# Patient Record
Sex: MALE | Race: WHITE | Employment: FULL TIME | URBAN - METROPOLITAN AREA
[De-identification: names, ages, dates, MRNs, and addresses within clinical notes are randomized per-mention and may not be internally consistent; named-entity substitution may affect disease eponyms.]

---

## 2019-03-04 ENCOUNTER — HOSPITAL ENCOUNTER (OUTPATIENT)
Dept: ULTRASOUND IMAGING | Age: 59
Discharge: HOME | End: 2019-03-04
Attending: NURSE PRACTITIONER
Payer: COMMERCIAL

## 2019-03-04 DIAGNOSIS — N44.2 TESTICULAR CYST: ICD-10-CM

## 2019-03-04 DIAGNOSIS — N50.811 RIGHT TESTICULAR PAIN: ICD-10-CM

## 2019-03-04 PROCEDURE — 76870 US EXAM SCROTUM: CPT

## 2021-08-11 ENCOUNTER — ANESTHESIA EVENT (OUTPATIENT)
Dept: SURGERY | Age: 61
End: 2021-08-11
Payer: COMMERCIAL

## 2021-08-12 ENCOUNTER — HOSPITAL ENCOUNTER (OUTPATIENT)
Age: 61
Setting detail: OUTPATIENT SURGERY
Discharge: HOME | End: 2021-08-12
Attending: UROLOGY | Admitting: UROLOGY
Payer: COMMERCIAL

## 2021-08-12 ENCOUNTER — ANESTHESIA (OUTPATIENT)
Dept: SURGERY | Age: 61
End: 2021-08-12
Payer: COMMERCIAL

## 2021-08-12 VITALS
HEIGHT: 67 IN | BODY MASS INDEX: 24.52 KG/M2 | RESPIRATION RATE: 18 BRPM | DIASTOLIC BLOOD PRESSURE: 94 MMHG | OXYGEN SATURATION: 96 % | HEART RATE: 44 BPM | SYSTOLIC BLOOD PRESSURE: 155 MMHG | TEMPERATURE: 97 F | WEIGHT: 156.2 LBS

## 2021-08-12 DIAGNOSIS — N50.3 EPIDIDYMAL CYST: Primary | ICD-10-CM

## 2021-08-12 PROCEDURE — 74011250636 HC RX REV CODE- 250/636: Performed by: ANESTHESIOLOGY

## 2021-08-12 PROCEDURE — 77030019908 HC STETH ESOPH SIMS -A: Performed by: REGISTERED NURSE

## 2021-08-12 PROCEDURE — 76060000033 HC ANESTHESIA 1 TO 1.5 HR: Performed by: UROLOGY

## 2021-08-12 PROCEDURE — 74011000250 HC RX REV CODE- 250: Performed by: UROLOGY

## 2021-08-12 PROCEDURE — 2709999900 HC NON-CHARGEABLE SUPPLY: Performed by: UROLOGY

## 2021-08-12 PROCEDURE — 77030010509 HC AIRWY LMA MSK TELE -A: Performed by: REGISTERED NURSE

## 2021-08-12 PROCEDURE — 74011250636 HC RX REV CODE- 250/636: Performed by: UROLOGY

## 2021-08-12 PROCEDURE — 77030002933 HC SUT MCRYL J&J -A: Performed by: UROLOGY

## 2021-08-12 PROCEDURE — 74011250636 HC RX REV CODE- 250/636: Performed by: NURSE ANESTHETIST, CERTIFIED REGISTERED

## 2021-08-12 PROCEDURE — 77030040922 HC BLNKT HYPOTHRM STRY -A: Performed by: REGISTERED NURSE

## 2021-08-12 PROCEDURE — 54840 REMOVE EPIDIDYMIS LESION: CPT | Performed by: UROLOGY

## 2021-08-12 PROCEDURE — 76210000020 HC REC RM PH II FIRST 0.5 HR: Performed by: UROLOGY

## 2021-08-12 PROCEDURE — 74011000250 HC RX REV CODE- 250: Performed by: NURSE ANESTHETIST, CERTIFIED REGISTERED

## 2021-08-12 PROCEDURE — 77030010507 HC ADH SKN DERMBND J&J -B: Performed by: UROLOGY

## 2021-08-12 PROCEDURE — 77030011283 HC ELECTRD NDL COVD -A: Performed by: UROLOGY

## 2021-08-12 PROCEDURE — 77030040503 HC DRN WND PENRS MDII -A: Performed by: UROLOGY

## 2021-08-12 PROCEDURE — 77030002888 HC SUT CHRMC J&J -A: Performed by: UROLOGY

## 2021-08-12 PROCEDURE — 77030040361 HC SLV COMPR DVT MDII -B: Performed by: UROLOGY

## 2021-08-12 PROCEDURE — 74011250637 HC RX REV CODE- 250/637: Performed by: ANESTHESIOLOGY

## 2021-08-12 PROCEDURE — 76210000006 HC OR PH I REC 0.5 TO 1 HR: Performed by: UROLOGY

## 2021-08-12 PROCEDURE — 76010000149 HC OR TIME 1 TO 1.5 HR: Performed by: UROLOGY

## 2021-08-12 RX ORDER — CEFAZOLIN SODIUM/WATER 2 G/20 ML
2 SYRINGE (ML) INTRAVENOUS ONCE
Status: COMPLETED | OUTPATIENT
Start: 2021-08-12 | End: 2021-08-12

## 2021-08-12 RX ORDER — SODIUM CHLORIDE 0.9 % (FLUSH) 0.9 %
5-40 SYRINGE (ML) INJECTION EVERY 8 HOURS
Status: DISCONTINUED | OUTPATIENT
Start: 2021-08-12 | End: 2021-08-12 | Stop reason: HOSPADM

## 2021-08-12 RX ORDER — ONDANSETRON 2 MG/ML
INJECTION INTRAMUSCULAR; INTRAVENOUS AS NEEDED
Status: DISCONTINUED | OUTPATIENT
Start: 2021-08-12 | End: 2021-08-12 | Stop reason: HOSPADM

## 2021-08-12 RX ORDER — MIDAZOLAM HYDROCHLORIDE 1 MG/ML
2 INJECTION, SOLUTION INTRAMUSCULAR; INTRAVENOUS
Status: DISCONTINUED | OUTPATIENT
Start: 2021-08-12 | End: 2021-08-12 | Stop reason: HOSPADM

## 2021-08-12 RX ORDER — PROPOFOL 10 MG/ML
INJECTION, EMULSION INTRAVENOUS AS NEEDED
Status: DISCONTINUED | OUTPATIENT
Start: 2021-08-12 | End: 2021-08-12 | Stop reason: HOSPADM

## 2021-08-12 RX ORDER — NALOXONE HYDROCHLORIDE 0.4 MG/ML
0.1 INJECTION, SOLUTION INTRAMUSCULAR; INTRAVENOUS; SUBCUTANEOUS AS NEEDED
Status: DISCONTINUED | OUTPATIENT
Start: 2021-08-12 | End: 2021-08-12 | Stop reason: HOSPADM

## 2021-08-12 RX ORDER — FENTANYL CITRATE 50 UG/ML
100 INJECTION, SOLUTION INTRAMUSCULAR; INTRAVENOUS ONCE
Status: DISCONTINUED | OUTPATIENT
Start: 2021-08-12 | End: 2021-08-12 | Stop reason: HOSPADM

## 2021-08-12 RX ORDER — FAMOTIDINE 20 MG/1
20 TABLET, FILM COATED ORAL ONCE
Status: COMPLETED | OUTPATIENT
Start: 2021-08-12 | End: 2021-08-12

## 2021-08-12 RX ORDER — HYDROMORPHONE HYDROCHLORIDE 2 MG/ML
INJECTION, SOLUTION INTRAMUSCULAR; INTRAVENOUS; SUBCUTANEOUS AS NEEDED
Status: DISCONTINUED | OUTPATIENT
Start: 2021-08-12 | End: 2021-08-12 | Stop reason: HOSPADM

## 2021-08-12 RX ORDER — LIDOCAINE HYDROCHLORIDE 10 MG/ML
0.1 INJECTION INFILTRATION; PERINEURAL AS NEEDED
Status: DISCONTINUED | OUTPATIENT
Start: 2021-08-12 | End: 2021-08-12 | Stop reason: HOSPADM

## 2021-08-12 RX ORDER — SODIUM CHLORIDE, SODIUM LACTATE, POTASSIUM CHLORIDE, CALCIUM CHLORIDE 600; 310; 30; 20 MG/100ML; MG/100ML; MG/100ML; MG/100ML
100 INJECTION, SOLUTION INTRAVENOUS CONTINUOUS
Status: DISCONTINUED | OUTPATIENT
Start: 2021-08-12 | End: 2021-08-12 | Stop reason: HOSPADM

## 2021-08-12 RX ORDER — SODIUM CHLORIDE 0.9 % (FLUSH) 0.9 %
5-40 SYRINGE (ML) INJECTION AS NEEDED
Status: DISCONTINUED | OUTPATIENT
Start: 2021-08-12 | End: 2021-08-12 | Stop reason: HOSPADM

## 2021-08-12 RX ORDER — LIDOCAINE HYDROCHLORIDE 20 MG/ML
INJECTION, SOLUTION EPIDURAL; INFILTRATION; INTRACAUDAL; PERINEURAL AS NEEDED
Status: DISCONTINUED | OUTPATIENT
Start: 2021-08-12 | End: 2021-08-12 | Stop reason: HOSPADM

## 2021-08-12 RX ORDER — HYDROMORPHONE HYDROCHLORIDE 2 MG/ML
0.5 INJECTION, SOLUTION INTRAMUSCULAR; INTRAVENOUS; SUBCUTANEOUS
Status: DISCONTINUED | OUTPATIENT
Start: 2021-08-12 | End: 2021-08-12 | Stop reason: HOSPADM

## 2021-08-12 RX ORDER — SODIUM CHLORIDE 9 MG/ML
25 INJECTION, SOLUTION INTRAVENOUS CONTINUOUS
Status: DISCONTINUED | OUTPATIENT
Start: 2021-08-12 | End: 2021-08-12 | Stop reason: HOSPADM

## 2021-08-12 RX ORDER — HYDROCODONE BITARTRATE AND ACETAMINOPHEN 5; 325 MG/1; MG/1
1 TABLET ORAL
Qty: 20 TABLET | Refills: 0 | Status: SHIPPED | OUTPATIENT
Start: 2021-08-12 | End: 2021-08-17

## 2021-08-12 RX ORDER — HYDROCODONE BITARTRATE AND ACETAMINOPHEN 7.5; 325 MG/1; MG/1
1 TABLET ORAL AS NEEDED
Status: DISCONTINUED | OUTPATIENT
Start: 2021-08-12 | End: 2021-08-12 | Stop reason: HOSPADM

## 2021-08-12 RX ORDER — OXYCODONE HYDROCHLORIDE 5 MG/1
5 TABLET ORAL
Status: COMPLETED | OUTPATIENT
Start: 2021-08-12 | End: 2021-08-12

## 2021-08-12 RX ORDER — BUPIVACAINE HYDROCHLORIDE 5 MG/ML
INJECTION, SOLUTION EPIDURAL; INTRACAUDAL AS NEEDED
Status: DISCONTINUED | OUTPATIENT
Start: 2021-08-12 | End: 2021-08-12 | Stop reason: HOSPADM

## 2021-08-12 RX ADMIN — OXYCODONE HYDROCHLORIDE 5 MG: 5 TABLET ORAL at 17:38

## 2021-08-12 RX ADMIN — HYDROMORPHONE HYDROCHLORIDE 0.2 MG: 2 INJECTION INTRAMUSCULAR; INTRAVENOUS; SUBCUTANEOUS at 17:07

## 2021-08-12 RX ADMIN — HYDROMORPHONE HYDROCHLORIDE 0.2 MG: 2 INJECTION INTRAMUSCULAR; INTRAVENOUS; SUBCUTANEOUS at 17:00

## 2021-08-12 RX ADMIN — ONDANSETRON 4 MG: 2 INJECTION INTRAMUSCULAR; INTRAVENOUS at 16:15

## 2021-08-12 RX ADMIN — HYDROMORPHONE HYDROCHLORIDE 0.2 MG: 2 INJECTION INTRAMUSCULAR; INTRAVENOUS; SUBCUTANEOUS at 17:03

## 2021-08-12 RX ADMIN — CEFAZOLIN 2 G: 1 INJECTION, POWDER, FOR SOLUTION INTRAVENOUS at 16:15

## 2021-08-12 RX ADMIN — FAMOTIDINE 20 MG: 20 TABLET, FILM COATED ORAL at 14:03

## 2021-08-12 RX ADMIN — LIDOCAINE HYDROCHLORIDE 100 MG: 20 INJECTION, SOLUTION EPIDURAL; INFILTRATION; INTRACAUDAL; PERINEURAL at 16:10

## 2021-08-12 RX ADMIN — PROPOFOL 300 MG: 10 INJECTION, EMULSION INTRAVENOUS at 16:10

## 2021-08-12 RX ADMIN — SODIUM CHLORIDE, SODIUM LACTATE, POTASSIUM CHLORIDE, AND CALCIUM CHLORIDE: 600; 310; 30; 20 INJECTION, SOLUTION INTRAVENOUS at 16:04

## 2021-08-12 NOTE — ANESTHESIA PREPROCEDURE EVALUATION
Relevant Problems   No relevant active problems       Anesthetic History               Review of Systems / Medical History  Patient summary reviewed and pertinent labs reviewed    Pulmonary                   Neuro/Psych              Cardiovascular              Hyperlipidemia    Exercise tolerance: >4 METS     GI/Hepatic/Renal     GERD           Endo/Other      Hypothyroidism       Other Findings              Physical Exam    Airway  Mallampati: II  TM Distance: > 6 cm  Neck ROM: normal range of motion   Mouth opening: Normal     Cardiovascular  Regular rate and rhythm,  S1 and S2 normal,  no murmur, click, rub, or gallop             Dental  No notable dental hx       Pulmonary  Breath sounds clear to auscultation               Abdominal         Other Findings            Anesthetic Plan    ASA: 2  Anesthesia type: general            Anesthetic plan and risks discussed with: Patient

## 2021-08-12 NOTE — BRIEF OP NOTE
Brief Postoperative Note    Patient: [facility]  YOB: 1960  MRN: [id number]    Date of Procedure: 8/12/2021     Pre-Op Diagnosis: Scrotal cyst [L72.9]  Spermatocele [N43.40]  Hyperplasia of prostate with urinary obstruction [N40.1, N13.8]    Post-Op Diagnosis: Same as preoperative diagnosis.       Procedure(s):  RIGHT SPERMATOCELECTOMY    Surgeon(s):  Yin Saldana MD    Surgical Assistant: None    Anesthesia: General     Estimated Blood Loss (mL): Minimal    Complications: None    Specimens: * No specimens in log *     Implants: * No implants in log *    Drains: * No LDAs found *    Findings: see op note    Electronically Signed by Juany Art MD on 8/12/2021 at 5:00 PM

## 2021-08-12 NOTE — H&P
Steve Prescott  : 1960         Chief Complaint   Patient presents with    Cyst         HPI      Steve Prescott is a 63 y.o. male had acute onset of perineal pressure and difficulty voiding.   He was evaluated by Dr. Austen Portillo and was given a prescription for ciprofloxacin and also tamsulosin. Shelton Lucia reports after a couple of doses of medication his symptoms started to resolve. Shelton Lucia has completed the ciprofloxacin and stop the Flomax because he did not like the side effects of the retrograde ejaculation. Shelton Lucia did go to the pharmacy and is currently taking saw palmetto.     Currently voiding well. May have had prostatitis.    Exam by NP showed possible right spermatocele. He is considering having this removed. He finished course of Cipro, stopped Flomax. Had scrotal US 3-19: IMPRESSION:  1. Large epididymal head cyst/spermatocele on the right. 2. Indeterminate hypoechoic area within the right epididymal head measuring 6 x  3 x 8 mm. Recommend 3-6 month follow-up exam for further evaluation. PSA 2.7 in 2018.   Exam in 2019 showed 2-3 cm spermatocele on right. We planned spermatocelectomy then,but never had the surgery due the Covid pandemic. He returns to discuss treatment. The right spermatocele is getting larger and more bothersome. He also has somewhat weak urinary stream.   He races vintage cars. Past Medical History:   Diagnosis Date    Allergic rhinitis, cause unspecified      Chronic prostatitis      Impotence of organic origin      Other and unspecified hyperlipidemia      Unspecified disorder of male genital organs      Unspecified disorder of thyroid        No past surgical history on file.          Current Outpatient Medications   Medication Sig Dispense Refill    aspirin delayed-release 81 mg tablet Take 81 mg by mouth daily.        cholecalciferol (VITAMIN D3) 1,000 unit cap Take 1,000 Units by mouth daily.        pravastatin (PRAVACHOL) 10 mg tablet Take  by mouth nightly.        levothyroxine (SYNTHROID) 50 mcg tablet Take  by mouth Daily (before breakfast).        sildenafil citrate (VIAGRA) 50 mg tablet Take 50 mg by mouth as needed.                Allergies   Allergen Reactions    Other Medication Unknown (comments)       Steroids      Social History            Socioeconomic History    Marital status:        Spouse name: Not on file    Number of children: Not on file    Years of education: Not on file    Highest education level: Not on file   Occupational History    Not on file   Tobacco Use    Smoking status: Never Smoker    Smokeless tobacco: Never Used   Substance and Sexual Activity    Alcohol use: Yes       Alcohol/week: 0.0 standard drinks    Drug use: Never    Sexual activity: Not on file   Other Topics Concern    Not on file   Social History Narrative    Not on file      Social Determinants of Health          Financial Resource Strain:     Difficulty of Paying Living Expenses:    Food Insecurity:     Worried About Running Out of Food in the Last Year:     [address] in the Last Year:    Transportation Needs:     Lack of Transportation (Medical):  Lack of Transportation (Non-Medical):    Physical Activity:     Days of Exercise per Week:     Minutes of Exercise per Session:    Stress:     Feeling of Stress :    Social Connections:     Frequency of Communication with Friends and Family:     Frequency of Social Gatherings with Friends and Family:     Attends Sikh Services:     Active Member of Clubs or Organizations:     Attends Club or Organization Meetings:     Marital Status:    Intimate Partner Violence:     Fear of Current or Ex-Partner:     Emotionally Abused:     Physically Abused:     Sexually Abused:             Family History   Problem Relation Age of Onset    Hypertension Father           Review of Systems  Constitutional:   Negative for fever. GI:  Negative for nausea.   Genitourinary: Positive for testicular pain.        There were no vitals taken for this visit. Physical Exam  General   Mental Status - Patient is alert and oriented X3. General Appearance - Not in acute distress. Build & Nutrition - Well nourished and Well developed. Gait - Normal.        Eye   Pupil - Bilateral - Normal, Equal and Round.        Chest and Lung Exam   Auscultation:   Lung Sounds: - Normal, clear to auscultation.        Cardiovascular   Cardiovascular examination reveals  - normal heart sounds, regular rate and rhythm with no murmurs.        Abdomen   Palpation/Percussion: Palpation and Percussion of the abdomen reveal - No Rebound tenderness, No Rigidity (guarding), No hepatosplenomegaly and No Palpable abdominal masses. Auscultation: Auscultation of the abdomen reveals - Bowel sounds normal.        Male Genitourinary   Evaluation of genitourinary system reveals - scrotum non-tender, no masses, normal testes, no palpable masses, 5 cm cystic structure superior to right testis. , urethral meatus normal, no discharge, normal penis   LETICIA deferred. Peripheral Vascular   Lower Extremity: Inspection - Bilateral - Inspection Normal.        Neurologic   Neurologic evaluation reveals  - upper and lower extremity deep tendon reflexes intact bilaterally . Cranial Nerves: - Cranial nerves are grossly intact.        Neuropsychiatric   The patient's mood and affect are described as  - normal.        Musculoskeletal  Global Assessment   Examination of related systems reveals - no generalized swelling or edema of extremities.        Lymphatic  General Lymphatics   Description - No Generalized lymphadenopathy.  Tenderness - Non Tender.        Urinalysis  UA - Dipstick         Results for orders placed or performed in visit on 07/21/21   AMB POC URINALYSIS DIP STICK AUTO W/O MICRO (PGU)     Status: None   Result Value Ref Range Status     Glucose (UA POC) Negative Negative mg/dL Final     Bilirubin (UA POC) Negative Negative Final     Ketones (UA POC) Negative Negative Final     Specific gravity (UA POC) 1.010 1.001 - 1.035 Final     Blood (UA POC) Negative Negative Final     pH (UA POC) 7 4.6 - 8.0 Final     Protein (UA POC) Negative Negative Final     Urobilinogen (POC) 0.2 mg/dL   Final     Nitrites (UA POC) Negative Negative Final     Leukocyte esterase (UA POC) Negative Negative Final   Results for orders placed or performed in visit on 04/08/19   AMB POC URINALYSIS DIP STICK AUTO W/ MICRO (PGU)     Status: None   Result Value Ref Range Status     Glucose (UA POC) Negative Negative mg/dL Final     Bilirubin (UA POC) Negative Negative Final     Ketones (UA POC) Negative Negative Final     Specific gravity (UA POC) 1.010 1.001 - 1.035 Final     Blood (UA POC) Negative Negative Final     pH (UA POC) 6.5 4.6 - 8.0 Final     Protein (UA POC) Negative Negative Final     Urobilinogen (POC) 0.2 mg/dL   Final     Nitrites (UA POC) Negative Negative Final     Leukocyte esterase (UA POC) Negative Negative Final         UA - Micro  WBC - 0  RBC - 0  Bacteria - 0  Epith - 0     Physical Exam     Assessment and Plan      ICD-10-CM ICD-9-CM     1. Scrotal cyst  L72.9 706.2 AMB POC URINALYSIS DIP STICK AUTO W/O MICRO (PGU)   2. Spermatocele of epididymis  N43.40 608. 1     3. BPH with obstruction/lower urinary tract symptoms  N40.1 600.01       N13.8 599.69        He would like to proceed with right spermatocelectomy. Risk of bleeding, infection, discussed. He does not treatment for his BPH. Needs PSA this year.

## 2021-08-12 NOTE — ANESTHESIA POSTPROCEDURE EVALUATION
Procedure(s):  RIGHT SPERMATOCELECTOMY.     general    Anesthesia Post Evaluation      Multimodal analgesia: multimodal analgesia used between 6 hours prior to anesthesia start to PACU discharge  Patient location during evaluation: PACU  Patient participation: complete - patient participated  Level of consciousness: awake and alert  Pain management: adequate  Airway patency: patent  Anesthetic complications: no  Cardiovascular status: acceptable  Respiratory status: acceptable  Hydration status: acceptable  Post anesthesia nausea and vomiting:  none  Final Post Anesthesia Temperature Assessment:  Normothermia (36.0-37.5 degrees C)      INITIAL Post-op Vital signs:   Vitals Value Taken Time   /94 08/12/21 1751   Temp 36.2 °C (97.2 °F) 08/12/21 1714   Pulse 44 08/12/21 1818   Resp 16 08/12/21 1741   SpO2 96 % 08/12/21 1818

## 2021-08-12 NOTE — DISCHARGE INSTRUCTIONS
What to Expect at [facility]    This surgery was done to remove the fluid and to stop the buildup of fluid in the scrotum. After your surgery, you may feel more tired than usual and have some mild groin pain for several days. Your groin and scrotum may be swollen or bruised. This usually gets better in 2 to 3 weeks. You will probably be able to go back to work or school 4 to 7 days after surgery. But you will need to avoid strenuous exercise or heavy lifting for 2 to 4 weeks. This care sheet gives you a general idea about how long it will take for you to recover. But each person recovers at a different pace. Follow the steps below to get better as quickly as possible. How can you care for yourself at home? Activity  · Rest when you feel tired. Getting enough sleep will help you recover. · Try to walk each day. Start by walking a little more than you did the day before. Bit by bit, increase the amount you walk. Walking boosts blood flow and helps prevent pneumonia and constipation. · You may shower 24 hours after surgery, if your doctor says it is okay. Pat the cut (incision) dry. Do not take a bath for the first week, or until your doctor tells you it is okay. · You may return to work or school when you are ready. This is usually in about 4 to 7 days. · Avoid strenuous activities, such as bicycle riding, jogging, weight lifting, or aerobic exercise, until your doctor says it is okay. · For 2 to 4 weeks, avoid lifting anything that would make you strain. This may include heavy grocery bags and milk containers, a heavy briefcase or backpack, cat litter or dog food bags, a vacuum , or a child. Diet  · You can eat your normal diet. If your stomach is upset, try bland, low-fat foods like plain rice, broiled chicken, toast, and yogurt. · Drink plenty of fluids to avoid becoming dehydrated. · You may notice that your bowel movements are not regular right after your surgery. This is common. Try to avoid constipation and straining with bowel movements. You may want to take a fiber supplement every day. If you have not had a bowel movement after a couple of days, ask your doctor about taking a mild laxative. Medicines  · Your doctor will tell you if and when you can restart your medicines. He or she will also give you instructions about taking any new medicines. · If you take blood thinners, such as warfarin (Coumadin), clopidogrel (Plavix), or aspirin, be sure to talk to your doctor. He or she will tell you if and when to start taking those medicines again. Make sure that you understand exactly what your doctor wants you to do. · Take pain medicines exactly as directed. ¨ If the doctor gave you a prescription medicine for pain, take it as prescribed. ¨ If you are not taking a prescription pain medicine, ask your doctor if you can take an over-the-counter medicine. · If you think pain medicine is making you sick to your stomach:  ¨ Take your medicine after meals (unless the doctor has told you not to). ¨ Ask your doctor for a different pain medicine. · If your doctor prescribed antibiotics, take them as directed. Do not stop taking them just because you feel better. You need to take the full course of antibiotics. Incision care  · If you have strips of tape on the cut (incision) the doctor made, leave the tape on for a week or until it falls off. · Wash the area daily with warm, soapy water, and pat it dry. Don't use hydrogen peroxide or alcohol, which can slow healing. You may cover the area with a gauze bandage if it weeps or rubs against clothing. Change the bandage every day. · Keep the area clean and dry. Follow-up care is a key part of your treatment and safety. Be sure to make and go to all appointments, and call your doctor if you are having problems. It's also a good idea to know your test results and keep a list of the medicines you take. When should you call for help?   Call [phone] anytime you think you may need emergency care. For example, call if:  · You passed out (lost consciousness). · You have severe trouble breathing. · You have sudden chest pain and shortness of breath, or you cough up blood. Call your doctor now or seek immediate medical care if:  · You are sick to your stomach or cannot keep fluids down. · You have pain that does not get better after you take pain medicine. · You have a fever over 100.4 F.  · You have loose stitches, or your incision comes open. · Bright red blood has soaked through the bandage over your incision. · Your scrotum gets more swollen. · You have signs of infection, such as:  ¨ Increased pain, swelling, warmth, or redness. ¨ Red streaks leading from the incision. ¨ Pus draining from the incision. ¨ Swollen lymph nodes in your groin, neck, or armpits. Watch closely for changes in your health, and be sure to contact your doctor if you have any problems. After general anesthesia or intravenous sedation, for 24 hours or while taking prescription Narcotics:  · Limit your activities  · A responsible adult needs to be with you for the next 24 hours  · Do not drive and operate hazardous machinery  · Do not make important personal or business decisions  · Do not drink alcoholic beverages  · If you have not urinated within 8 hours after discharge, and you are experiencing discomfort from urinary retention, please go to the nearest ED. · If you have sleep apnea and have a CPAP machine, please use it for all naps and sleeping. · Please use caution when taking narcotics and any of your home medications that may cause drowsiness. *  Please give a list of your current medications to your Primary Care Provider. *  Please update this list whenever your medications are discontinued, doses are      changed, or new medications (including over-the-counter products) are added.   *  Please carry medication information at all times in case of emergency situations. These are general instructions for a healthy lifestyle:  No smoking/ No tobacco products/ Avoid exposure to second hand smoke  Surgeon General's Warning:  Quitting smoking now greatly reduces serious risk to your health. Obesity, smoking, and sedentary lifestyle greatly increases your risk for illness  A healthy diet, regular physical exercise & weight monitoring are important for maintaining a healthy lifestyle    You may be retaining fluid if you have a history of heart failure or if you experience any of the following symptoms:  Weight gain of 3 pounds or more overnight or 5 pounds in a week, increased swelling in our hands or feet or shortness of breath while lying flat in bed. Please call your doctor as soon as you notice any of these symptoms; do not wait until your next office visit.

## 2021-08-13 NOTE — OP NOTES
[facility]  OPERATIVE REPORT    Name:  Madhuri Saldaña  MR#:  [id number]  :  1960  ACCOUNT #:  [de-identified]  DATE OF SERVICE:  2021    PREOPERATIVE DIAGNOSIS:  Spermatocele. POSTOPERATIVE DIAGNOSIS:  Epididymal cyst.    PROCEDURE PERFORMED:  Excision of epididymal cyst.    SURGEON:  Mikaela Hinton MD    ASSISTANT:  None. ANESTHESIA:  General.    COMPLICATIONS:  None. SPECIMENS REMOVED:  None. IMPLANTS:  None. ESTIMATED BLOOD LOSS:  Minimal.    FINDINGS:  A 4 cm cystic structure rising from the right superior epididymis containing clear fluid. PROCEDURE:  The patient was given general anesthetic, placed in the supine position. Scrotum was shaved, prepped and draped in sterile fashion. An examination showed normal testes by palpation. On the right side there is a cystic structure superior to the right testis which measures 3-4 cm in diameter. We used Marcaine to infiltrate the scrotal wall and made a transverse incision overlying the cystic structure. The scrotal wall was sharply divided. We used the Bovie with a needle tip. I was able to visualize the cystic structure arising from the right superior epididymis. This was dissected away from the underlying tissue and removed. It contained clear fluid. The testicle showed normal structures. We then achieved meticulous hemostasis on the superior portion of the testis and epididymis. Testicle was placed back into its anatomical position. Dartos was closed using running 3-0 chromic with one stitch to incorporate the anterior tunica albuginea of the testicle to prevent torsion. Skin was closed using running 4-0 subcuticular Monocryl. We placed some Dermabond on the skin and a sterile dressing. PLAN:  He will be discharged home. He is to return to the office in 10 days for wound check. Ice to the scrotum for 24 hours.           MD MARIAELENA [id number]  D: 08/12/2021 17:10  T:  08/13/2021 1:32  JOB #:  [id number]

## 2021-12-17 ENCOUNTER — HOSPITAL ENCOUNTER (OUTPATIENT)
Dept: LAB | Age: 61
Discharge: HOME | End: 2021-12-17

## 2021-12-17 PROCEDURE — 88305 TISSUE EXAM BY PATHOLOGIST: CPT

## 2022-07-08 ENCOUNTER — OFFICE VISIT (OUTPATIENT)
Dept: UROLOGY | Age: 62
End: 2022-07-08
Payer: COMMERCIAL

## 2022-07-08 DIAGNOSIS — R34 DECREASED URINE OUTPUT: ICD-10-CM

## 2022-07-08 DIAGNOSIS — N50.3 EPIDIDYMAL CYST: Primary | ICD-10-CM

## 2022-07-08 DIAGNOSIS — N50.811 PAIN IN RIGHT TESTICLE: ICD-10-CM

## 2022-07-08 DIAGNOSIS — N52.9 ERECTILE DYSFUNCTION, UNSPECIFIED ERECTILE DYSFUNCTION TYPE: ICD-10-CM

## 2022-07-08 LAB
BILIRUBIN, URINE, POC: NEGATIVE
BLOOD URINE, POC: NEGATIVE
GLUCOSE URINE, POC: NEGATIVE
KETONES, URINE, POC: NEGATIVE
LEUKOCYTE ESTERASE, URINE, POC: NEGATIVE
NITRITE, URINE, POC: NEGATIVE
PH, URINE, POC: 6 (ref 4.6–8)
PROTEIN,URINE, POC: NEGATIVE
PVR, POC: ABNORMAL CC
SPECIFIC GRAVITY, URINE, POC: 1.02 (ref 1–1.03)
URINALYSIS CLARITY, POC: ABNORMAL
URINALYSIS COLOR, POC: ABNORMAL
UROBILINOGEN, POC: ABNORMAL

## 2022-07-08 PROCEDURE — 51798 US URINE CAPACITY MEASURE: CPT | Performed by: NURSE PRACTITIONER

## 2022-07-08 PROCEDURE — 76870 US EXAM SCROTUM: CPT | Performed by: NURSE PRACTITIONER

## 2022-07-08 PROCEDURE — 81003 URINALYSIS AUTO W/O SCOPE: CPT | Performed by: NURSE PRACTITIONER

## 2022-07-08 PROCEDURE — 99214 OFFICE O/P EST MOD 30 MIN: CPT | Performed by: NURSE PRACTITIONER

## 2022-07-08 NOTE — PROGRESS NOTES
 Smokeless tobacco: Never Used   Substance and Sexual Activity    Alcohol use: Yes     Alcohol/week: 0.0 standard drinks    Drug use: Never    Sexual activity: Not on file   Other Topics Concern    Not on file   Social History Narrative    Not on file     Social Determinants of Health     Financial Resource Strain:     Difficulty of Paying Living Expenses: Not on file   Food Insecurity:     Worried About Running Out of Food in the Last Year: Not on file    Jose Martin of Food in the Last Year: Not on file   Transportation Needs:     Lack of Transportation (Medical): Not on file    Lack of Transportation (Non-Medical): Not on file   Physical Activity:     Days of Exercise per Week: Not on file    Minutes of Exercise per Session: Not on file   Stress:     Feeling of Stress : Not on file   Social Connections:     Frequency of Communication with Friends and Family: Not on file    Frequency of Social Gatherings with Friends and Family: Not on file    Attends Evangelical Services: Not on file    Active Member of [address] [place] [address] or Organizations: Not on file    Attends Club or Organization Meetings: Not on file    Marital Status: Not on file   Intimate Partner Violence:     Fear of Current or Ex-Partner: Not on file    Emotionally Abused: Not on file    Physically Abused: Not on file    Sexually Abused: Not on file   Housing Stability:     Unable to Pay for Housing in the Last Year: Not on file    Number of [place] in the Last Year: Not on file    Unstable Housing in the Last Year: Not on file     Family History   Problem Relation Age of Onset    Hypertension Father        Review of Systems  Constitutional: Negative  GI: Neg GI ROS  Genitourinary: Positive for incomplete emptying.       Urinalysis  UA - Dipstick  Results for orders placed or performed in visit on 07/08/22   AMB POC URINALYSIS DIP STICK AUTO W/O MICRO   Result Value Ref Range    Color (UA POC)      Clarity (UA POC)      Glucose, Urine, POC Negative Negative    Bilirubin, Urine, POC Negative Negative    KETONES, Urine, POC Negative Negative    Specific Gravity, Urine, POC 1.025 1.001 - 1.035    Blood (UA POC) Negative Negative    pH, Urine, POC 6.0 4.6 - 8.0    Protein, Urine, POC Negative Negative    Urobilinogen, POC 2 mg/dL     Nitrite, Urine, POC Negative Negative    Leukocyte Esterase, Urine, POC Negative Negative       PHYSICAL EXAM    General appearance - well appearing and in no distress  Mental status - alert, oriented to person, place, and time  Neck - supple, no significant adenopathy  Chest/Lung-  Quiet, even and easy respiratory effort without use of accessory muscles  - Testes normal to palpation without mass. Phallus normal without mass or lesion present  Skin - normal coloration and turgor, no rashes        Assessment and Plan    ICD-10-CM    1. Epididymal cyst  N50.3 AMB POC URINALYSIS DIP STICK AUTO W/O MICRO     AMB POC PVR, RANDI,POST-VOID RES,US,NON-IMAGING     VA ECHO,SCROTUM & CONTENTS   2. Pain in right testicle  N50.811 VA ECHO,SCROTUM & CONTENTS   3. Decreased urine output  R34    4. Erectile dysfunction, unspecified erectile dysfunction type  N52.9    Will send pt for JERRY. Results will be called. Send urine for culture to ensure no infection. Will call results. Discussed trying flomax or rapaflo for sx, however he would like to hold for now. Taking viagra 25 mg PRN about twice per month. jacques snot feel sx or related to use. No refills needed today. Will follow up w Dr. Bossman Avila. To call sooner if needed. Susannah Forte is supervising physician today and he approves plan of care.

## 2022-07-11 LAB
BACTERIA SPEC CULT: NORMAL
SERVICE CMNT-IMP: NORMAL

## 2022-07-12 ENCOUNTER — PATIENT MESSAGE (OUTPATIENT)
Dept: UROLOGY | Age: 62
End: 2022-07-12

## 2022-07-12 DIAGNOSIS — N50.811 PAIN IN RIGHT TESTICLE: Primary | ICD-10-CM

## 2022-07-12 RX ORDER — CIPROFLOXACIN 500 MG/1
500 TABLET, FILM COATED ORAL 2 TIMES DAILY
Qty: 20 TABLET | Refills: 0 | Status: SHIPPED | OUTPATIENT
Start: 2022-07-12 | End: 2022-07-22

## 2022-07-12 NOTE — TELEPHONE ENCOUNTER
From: Geovanni Sanford  To: Yamilet Aime  Sent: 7/12/2022 3:02 PM EDT  Subject: Need Prescription    Vick Hernández, You asked about giving me a prescription for Flomax but we think it bothered me last time. I think Cipro was something I've taken multiple times without issues. I'm still having discomfort and since there is no UTI I can only assume it's prostate related. Please call in something or call me to discuss.   Onesimo Dougherty

## 2022-07-22 ENCOUNTER — HOSPITAL ENCOUNTER (OUTPATIENT)
Dept: ULTRASOUND IMAGING | Age: 62
Discharge: HOME | End: 2022-07-25
Payer: COMMERCIAL

## 2022-07-22 DIAGNOSIS — N50.811 PAIN IN RIGHT TESTICLE: ICD-10-CM

## 2022-07-22 PROCEDURE — 76870 US EXAM SCROTUM: CPT

## 2022-08-09 ENCOUNTER — TELEPHONE (OUTPATIENT)
Dept: SURGERY | Age: 62
End: 2022-08-09

## 2022-09-02 ENCOUNTER — OFFICE VISIT (OUTPATIENT)
Dept: UROLOGY | Age: 62
End: 2022-09-02
Payer: COMMERCIAL

## 2022-09-02 DIAGNOSIS — N50.819 PAIN IN TESTICLE, UNSPECIFIED LATERALITY: Primary | ICD-10-CM

## 2022-09-02 LAB
BILIRUBIN, URINE, POC: NEGATIVE
BLOOD URINE, POC: NEGATIVE
GLUCOSE URINE, POC: NEGATIVE
KETONES, URINE, POC: NEGATIVE
LEUKOCYTE ESTERASE, URINE, POC: NEGATIVE
NITRITE, URINE, POC: NEGATIVE
PH, URINE, POC: 7 (ref 4.6–8)
PROTEIN,URINE, POC: NEGATIVE
SPECIFIC GRAVITY, URINE, POC: 1 (ref 1–1.03)
URINALYSIS CLARITY, POC: NORMAL
URINALYSIS COLOR, POC: NORMAL
UROBILINOGEN, POC: NORMAL

## 2022-09-02 PROCEDURE — 99214 OFFICE O/P EST MOD 30 MIN: CPT | Performed by: UROLOGY

## 2022-09-02 PROCEDURE — 81003 URINALYSIS AUTO W/O SCOPE: CPT | Performed by: UROLOGY

## 2022-09-02 RX ORDER — SULFAMETHOXAZOLE AND TRIMETHOPRIM 400; 80 MG/1; MG/1
1 TABLET ORAL DAILY
Qty: 14 TABLET | Refills: 0 | Status: SHIPPED | OUTPATIENT
Start: 2022-09-02 | End: 2022-09-09

## 2022-09-02 ASSESSMENT — ENCOUNTER SYMPTOMS
BACK PAIN: 0
ABDOMINAL PAIN: 0

## 2022-09-02 NOTE — PROGRESS NOTES
[facility]  [address]  [address] [facility] [address]  [facility], [address] [facility]  [phone]    Steven Wright  : 1960    Chief Complaint   Patient presents with    Follow-up     Testicular discomfort/Epididymal cyst not seen on US is worrisome        HPI     Steven Wright is a 57 y.o. male    had acute onset of perineal pressure and difficulty voiding. He was evaluated by Dr. Starr Simms and was given a prescription for ciprofloxacin and also tamsulosin. He reports after a couple of doses of medication his symptoms started to resolve. He has completed the ciprofloxacin and stop the Flomax because he did not like the side effects of the retrograde ejaculation. He did go to the pharmacy and is currently taking saw palmetto. Currently voiding well. May have had prostatitis. Exam by NP showed possible right spermatocele. He is considering having this removed. He finished course of Cipro, stopped Flomax. Had scrotal US 3-19: IMPRESSION:  1. Large epididymal head cyst/spermatocele on the right. 2. Indeterminate hypoechoic area within the right epididymal head measuring 6 x  3 x 8 mm. Recommend 3-6 month follow-up exam for further evaluation. PSA 2.7 in 2018. Exam in 2019 showed 2-3 cm spermatocele on right. We planned spermatocelectomy then,but never had the surgery due the Covid pandemic. He returns to discuss treatment. The right spermatocele is getting larger and more bothersome. He also has somewhat weak urinary stream.   He races vintage cars. Had  excision of right epididymal cyst on 21. He has been having pain on the right side since the surgery and the right testis is higher than the left. Exam 10-15-21: edema or small hematoma of right superior epididymis   Follow up States that he still has 2 areas of tender, in the right scrotal incision and superior to the right testis. Here today to discuss ongoing right testicle pain.  He noted some improvement after surgery for epididymal cyst, however this has recently returned. He reports he is able to palpate. He is also concerned over bumps that develop to dorsal portion of penis after intercourse. Erection takes a while to reduce after intercourse. Bumps are not painful. Feels he has decreased urinary output in relation to water intake. Urine is dark at times. Nocturia 1-2 which is baseline. Afebrile. Denies gross hematuria. Has started saw palmetto. PVR 19 cc via US in office in 7-22. PSA 2.41 on 11/16/21. Scrotal US 7-22:       1. Persistent heterogeneity of the right epididymis status post spermatocele   resection without hyperemia or other findings to suggest acute epididymitis. 2.  Otherwise, unremarkable examination. He has two issues today. States that the dorsal vein of the penis will occasionally become focally swollen and tender. He also thinks that the veins in his left arm and his neck do the same thing. This is associated with pain in the glans penis. Also has noted a nontender focal swelling in the right inferior scrotum.    Exam    Past Medical History:   Diagnosis Date    Allergic rhinitis, cause unspecified     Chronic prostatitis     GERD (gastroesophageal reflux disease)     Impotence of organic origin     Other and unspecified hyperlipidemia     Unspecified disorder of male genital organs     Unspecified disorder of thyroid      Past Surgical History:   Procedure Laterality Date    COLONOSCOPY      TONSILLECTOMY       Current Outpatient Medications   Medication Sig Dispense Refill    sulfamethoxazole-trimethoprim (BACTRIM) 400-80 MG per tablet Take 1 tablet by mouth daily for 7 days 14 tablet 0    Saw Palmetto, Serenoa repens, (SAW PALMETTO PO) Take by mouth      aspirin 81 MG EC tablet Take 81 mg by mouth daily      vitamin D 25 MCG (1000 UT) CAPS Take 1,000 Units by mouth daily      pravastatin (PRAVACHOL) 10 MG tablet Take by mouth      sildenafil (VIAGRA) 100 MG tablet Take 100 mg by mouth as needed      thyroid (ARMOUR) 15 MG tablet Take by mouth daily      famotidine (PEPCID) 20 MG tablet Take 20 mg by mouth      levoFLOXacin (LEVAQUIN) 500 MG tablet Take 500 mg by mouth daily       No current facility-administered medications for this visit. Allergies   Allergen Reactions    Levothyroxine Other (See Comments)     palpitations    Tamsulosin Rash     Social History     Socioeconomic History    Marital status:      Spouse name: Not on file    Number of children: Not on file    Years of education: Not on file    Highest education level: Not on file   Occupational History    Not on file   Tobacco Use    Smoking status: Never    Smokeless tobacco: Never   Substance and Sexual Activity    Alcohol use: Yes     Alcohol/week: 0.0 standard drinks    Drug use: Never    Sexual activity: Not on file   Other Topics Concern    Not on file   Social History Narrative    Not on file     Social Determinants of Health     Financial Resource Strain: Not on file   Food Insecurity: Not on file   Transportation Needs: Not on file   Physical Activity: Not on file   Stress: Not on file   Social Connections: Not on file   Intimate Partner Violence: Not on file   Housing Stability: Not on file     Family History   Problem Relation Age of Onset    Hypertension Father        Review of Systems  Constitutional:   Negative for fever. GI:  Negative for abdominal pain. Genitourinary: Positive for testicular pain. Musculoskeletal:  Negative for back pain. There were no vitals taken for this visit. PE: penis circumcised, normal, no plaques. Mild tenderness of right superior epididymis. No testicular masses.    Urinalysis  UA - Dipstick  Results for orders placed or performed in visit on 09/02/22   AMB POC URINALYSIS DIP STICK AUTO W/O MICRO   Result Value Ref Range    Color (UA POC)      Clarity (UA POC)      Glucose, Urine, POC Negative Negative    Bilirubin, Urine, POC Negative Negative    KETONES, Urine,

## 2023-01-23 ENCOUNTER — HOSPITAL ENCOUNTER (OUTPATIENT)
Dept: GENERAL RADIOLOGY | Age: 63
Discharge: HOME | End: 2023-01-26

## 2023-01-23 DIAGNOSIS — T14.90XA INJURY: ICD-10-CM

## 2023-01-23 PROCEDURE — 73562 X-RAY EXAM OF KNEE 3: CPT

## 2023-03-06 ENCOUNTER — OFFICE VISIT (OUTPATIENT)
Dept: ORTHOPEDIC SURGERY | Age: 63
End: 2023-03-06

## 2023-03-06 DIAGNOSIS — S83.281A TEAR OF LATERAL MENISCUS OF RIGHT KNEE, CURRENT, UNSPECIFIED TEAR TYPE, INITIAL ENCOUNTER: Primary | ICD-10-CM

## 2023-03-06 DIAGNOSIS — S89.91XA RIGHT KNEE INJURY, INITIAL ENCOUNTER: ICD-10-CM

## 2023-03-10 RX ORDER — DICLOFENAC SODIUM 75 MG/1
75 TABLET, DELAYED RELEASE ORAL 2 TIMES DAILY
Qty: 60 TABLET | Refills: 0 | Status: SHIPPED | OUTPATIENT
Start: 2023-03-10 | End: 2023-04-09

## 2023-03-17 ENCOUNTER — TELEPHONE (OUTPATIENT)
Dept: ORTHOPEDIC SURGERY | Age: 63
End: 2023-03-17

## 2023-03-17 NOTE — TELEPHONE ENCOUNTER
I spoke with Mr. Dexter Rodríguez today about his right knee. He had his first visit with physical therapy yesterday. He would like to hold off on any surgery at this time. He will complete his physical therapy and follow-up with us after for re-evaluation.

## 2023-04-27 ENCOUNTER — TELEPHONE (OUTPATIENT)
Dept: ORTHOPEDIC SURGERY | Age: 63
End: 2023-04-27

## 2023-05-01 ENCOUNTER — OFFICE VISIT (OUTPATIENT)
Dept: ORTHOPEDIC SURGERY | Age: 63
End: 2023-05-01

## 2023-05-01 DIAGNOSIS — S83.281D TEAR OF LATERAL MENISCUS OF RIGHT KNEE, CURRENT, UNSPECIFIED TEAR TYPE, SUBSEQUENT ENCOUNTER: Primary | ICD-10-CM

## 2023-05-01 RX ORDER — TRIAMCINOLONE ACETONIDE 40 MG/ML
40 INJECTION, SUSPENSION INTRA-ARTICULAR; INTRAMUSCULAR ONCE
Status: COMPLETED | OUTPATIENT
Start: 2023-05-01 | End: 2023-05-01

## 2023-05-01 RX ADMIN — TRIAMCINOLONE ACETONIDE 40 MG: 40 INJECTION, SUSPENSION INTRA-ARTICULAR; INTRAMUSCULAR at 10:00

## 2023-05-01 NOTE — PROGRESS NOTES
Name: Deborah Dickerson  YOB: 1960  Gender: male  MRN: [id number]      CC: Knee Pain (W/C recheck right knee)       HPI: Deborah Dickerson is a 60 y.o. male who returns for follow up on right knee pain. He states that he is gradually progressing with formal physical therapy; however, he continues to have persistent pain. Pain is a constant dull ache which localizes in the lateral joint line. Physical Examination:  General: no acute distress  Lungs: breathing easily  CV: regular rhythm by pulse  Right Knee: Negative effusion present. Tenderness to palpation in the lateral joint line. Full active and passive range of motion with no pain in the extremes. No pain with Samuel's localizing to the lateral joint line. Negative bounce home test.        Assessment:     ICD-10-CM    1. Tear of lateral meniscus of right knee, current, unspecified tear type, subsequent encounter  S83.281D triamcinolone acetonide (KENALOG-40) injection 40 mg     DRAIN/INJECT LARGE JOINT/BURSA     Amb External Referral To Physical Therapy          Plan:   I discussed with the patient continued conservative treatment versus surgical intervention. The quality of the MRI that he had previously is not high quality and we discussed repeating the MRI for confirmation of the lateral meniscus tear. We also discussed an intra-articular injection for diagnostic and therapeutic intervention with continued formal physical therapy. He will benefit from a corticosteroid injection and this can guide our treatment going forward he can continue going to physical therapy at this time. The patient elected to proceed with an intraarticular knee injection today. After verbal informed consent was obtained after sterile prep the Right knee  was injected from a anterior lateral approach with 4 cc of 0.25% Marcaine and 1 cc of 40 mg Kenalog.   The patient tolerated the procedure well was given postinjection flare

## 2023-05-15 ENCOUNTER — TELEPHONE (OUTPATIENT)
Dept: ORTHOPEDIC SURGERY | Age: 63
End: 2023-05-15

## 2023-05-15 DIAGNOSIS — S83.281D TEAR OF LATERAL MENISCUS OF RIGHT KNEE, CURRENT, UNSPECIFIED TEAR TYPE, SUBSEQUENT ENCOUNTER: Primary | ICD-10-CM

## 2023-05-15 NOTE — TELEPHONE ENCOUNTER
Pt got a call he dosen't know who called just wanted to let someone know he stated it was from this office

## 2023-05-23 ENCOUNTER — OFFICE VISIT (OUTPATIENT)
Dept: ORTHOPEDIC SURGERY | Age: 63
End: 2023-05-23

## 2023-05-23 DIAGNOSIS — S83.281D TEAR OF LATERAL MENISCUS OF RIGHT KNEE, CURRENT, UNSPECIFIED TEAR TYPE, SUBSEQUENT ENCOUNTER: Primary | ICD-10-CM

## 2023-05-23 DIAGNOSIS — M71.21 BAKER'S CYST OF KNEE, RIGHT: ICD-10-CM

## 2023-05-23 NOTE — PROGRESS NOTES
Name: Lily Etienne  YOB: 1960  Gender: male  MRN: [id number]      CC: Follow-up (Right Knee)       HPI: Lily Etienne is a 60 y.o. male who returns for follow up on right knee pain. He had a good response to the corticosteroid injection; however, he noticed increased swelling posteriorly that has been changing with activity. He has less knee pain anteriorly. He has not had his repeat MRI. Physical Examination:  General: no acute distress  Lungs: breathing easily  CV: regular rhythm by pulse  Right Knee: Full range of motion just a mild effusion minimal tenderness to palpation over the lateral joint line today. He does have a palpable palpable fluid collection posteriorly and posterior medially consistent with likely a Baker's cyst which is tender to palpation and causes pain at the extremes of extension. Assessment:     ICD-10-CM    1. Tear of lateral meniscus of right knee, current, unspecified tear type, subsequent encounter  S83.281D       2. Baker's cyst of knee, right  M71.21           Plan: We had a long discussion of treatment options I think it is very reasonable to repeat his MRI scan at this point. He did have a lateral meniscus tear he has had progressively worsening popliteal swelling likely consistent with a Baker's cyst which may be explained by intra-articular pathology. The previous MRI scan did not provide much detail due to the poor quality. We discussed potential aspiration and injection of the Baker's cyst however I like to define the pathology prior to proceeding with that. We will proceed with an MRI scan at [address] I will see him back after that to review the results and potentially perform an ultrasound-guided aspiration and injection of the Baker's cyst if that correlates with the MRI            Richy Turner MD, [address] [facility] and [facility]

## 2023-05-26 DIAGNOSIS — S83.281D TEAR OF LATERAL MENISCUS OF RIGHT KNEE, CURRENT, UNSPECIFIED TEAR TYPE, SUBSEQUENT ENCOUNTER: ICD-10-CM

## 2023-05-30 ENCOUNTER — PATIENT MESSAGE (OUTPATIENT)
Dept: ORTHOPEDIC SURGERY | Age: 63
End: 2023-05-30

## 2023-05-31 NOTE — TELEPHONE ENCOUNTER
From: [facility]  To: Dr. Luan Kruse: 5/30/2023 9:21 AM EDT  Subject: MRI    I had the new MRI done, who and when will someone take care of calling me to set up a follow up appointment to discuss our options?   Thanks,  Sreekanth  [phone]

## 2023-05-31 NOTE — TELEPHONE ENCOUNTER
Spoke with patient and let him know Dr. Alondra Coleman would try to touch base with him regarding the MRI during the times he is available. If not, we would be able to contact him Friday or Monday when he is back from his trip.

## 2023-06-02 ENCOUNTER — TELEPHONE (OUTPATIENT)
Dept: ORTHOPEDIC SURGERY | Age: 63
End: 2023-06-02

## 2023-06-02 NOTE — TELEPHONE ENCOUNTER
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 There is no obvious lateral meniscus tear. There is a small Baker's cyst posteriorly that extends posterior medially. Some patellar and quadriceps tendinosis. I called the patient and left a brief summary of this on his voicemail. I would not recommend any further intervention at this time. Continue nonoperative management no surgical intervention is warranted.   I will see him back in the office in 3-4 weeks if her prefers

## 2023-06-20 ENCOUNTER — OFFICE VISIT (OUTPATIENT)
Dept: ORTHOPEDIC SURGERY | Age: 63
End: 2023-06-20

## 2023-06-20 DIAGNOSIS — M25.561 ACUTE PAIN OF RIGHT KNEE: Primary | ICD-10-CM

## 2023-06-20 DIAGNOSIS — M71.21 BAKER'S CYST OF KNEE, RIGHT: ICD-10-CM

## 2023-06-20 DIAGNOSIS — S83.281D TEAR OF LATERAL MENISCUS OF RIGHT KNEE, CURRENT, UNSPECIFIED TEAR TYPE, SUBSEQUENT ENCOUNTER: ICD-10-CM

## 2023-06-20 NOTE — PROGRESS NOTES
Name: Drew Case  YOB: 1960  Gender: male  MRN: [id number]      CC: Follow-up (Right Knee)       HPI: Drew Case is a 60 y.o. male who returns for follow up on right knee pain. He has noticed much improvement in his symptoms since last visit. His only complaint is occasional posterior swelling        Physical Examination:  General: no acute distress  Lungs: breathing easily  CV: regular rhythm by pulse  Right Knee: Mild swelling posteriorly popliteal fossa not palpated clearly defined Baker's cyst today in the clinic. He does have some mild pain at the extremes of extension. He has no joint line tenderness over the medial or lateral joint line no effusion. Negative meniscal provocative testing. Ligamentously stable x4. Repeat MRI did not show an obvious meniscus tear. Some mild trochlear chondromalacia and small Baker's cyst.        Assessment:     ICD-10-CM    1. Acute pain of right knee  M25.561       2. Tear of lateral meniscus of right knee, current, unspecified tear type, subsequent encounter  S83.281D Amb External Referral To Physical Therapy      3. Baker's cyst of knee, right  M71.21           Plan:   We discussed that do not see an obvious meniscus tear on his repeat MRI scan. Think the majority of symptoms have resolved he does have some mild trochlear chondromalacia and arthritis changes which may have been exacerbated by this acute injury. He does have a Baker's cyst which comes and goes. I do not recommend any surgical treatment at this time. I think he can continue to work with no restrictions and have no permanent restrictions. Future treatment I do think it is reasonable that he will need an injection of the knee or the Baker's cyst 1-2 times a year for the next 2 years. No plans for knee arthroscopy at this point.   I think he would benefit from a strengthening program with physical therapy 2 times a week for the next 3 weeks and then transition to

## 2024-11-01 NOTE — TELEPHONE ENCOUNTER
Called pt, his pain concern is nodules of the dorsal penis. May have thrombophlebitis or Peyronies plaque. I told him to take NSAID's, I will check him when he comes in for his appt. 76

## 2025-08-25 ENCOUNTER — OFFICE VISIT (OUTPATIENT)
Dept: UROLOGY | Age: 65
End: 2025-08-25
Payer: MEDICARE

## 2025-08-25 DIAGNOSIS — R30.0 DYSURIA: Primary | ICD-10-CM

## 2025-08-25 DIAGNOSIS — N13.8 BPH WITH OBSTRUCTION/LOWER URINARY TRACT SYMPTOMS: ICD-10-CM

## 2025-08-25 DIAGNOSIS — N40.1 BPH WITH OBSTRUCTION/LOWER URINARY TRACT SYMPTOMS: ICD-10-CM

## 2025-08-25 LAB
BILIRUBIN, URINE, POC: NEGATIVE
BLOOD URINE, POC: NORMAL
GLUCOSE URINE, POC: NEGATIVE MG/DL
KETONES, URINE, POC: NEGATIVE MG/DL
LEUKOCYTE ESTERASE, URINE, POC: NEGATIVE
NITRITE, URINE, POC: NEGATIVE
PH, URINE, POC: 6 (ref 4.6–8)
PROTEIN,URINE, POC: NEGATIVE MG/DL
PVR, POC: 0 CC
SPECIFIC GRAVITY, URINE, POC: 1.02 (ref 1–1.03)
URINALYSIS CLARITY, POC: NORMAL
URINALYSIS COLOR, POC: NORMAL
UROBILINOGEN, POC: NORMAL MG/DL

## 2025-08-25 PROCEDURE — 51798 US URINE CAPACITY MEASURE: CPT | Performed by: NURSE PRACTITIONER

## 2025-08-25 PROCEDURE — 81003 URINALYSIS AUTO W/O SCOPE: CPT | Performed by: NURSE PRACTITIONER

## 2025-08-25 PROCEDURE — 1123F ACP DISCUSS/DSCN MKR DOCD: CPT | Performed by: NURSE PRACTITIONER

## 2025-08-25 PROCEDURE — 99214 OFFICE O/P EST MOD 30 MIN: CPT | Performed by: NURSE PRACTITIONER

## 2025-08-25 RX ORDER — TAMSULOSIN HYDROCHLORIDE 0.4 MG/1
0.4 CAPSULE ORAL DAILY
Qty: 30 CAPSULE | Refills: 1 | Status: SHIPPED | OUTPATIENT
Start: 2025-08-25

## 2025-08-25 RX ORDER — SULFAMETHOXAZOLE AND TRIMETHOPRIM 800; 160 MG/1; MG/1
1 TABLET ORAL 2 TIMES DAILY
Qty: 14 TABLET | Refills: 1 | Status: SHIPPED | OUTPATIENT
Start: 2025-08-25

## 2025-08-25 ASSESSMENT — ENCOUNTER SYMPTOMS: BACK PAIN: 0

## 2025-08-27 LAB
BACTERIA SPEC CULT: NORMAL
SERVICE CMNT-IMP: NORMAL

## (undated) DEVICE — SOLUTION IV 1000ML 0.9% SOD CHL

## (undated) DEVICE — SUT CHRMC 3-0 27IN SH BRN --

## (undated) DEVICE — AMD ANTIMICROBIAL GAUZE SPONGES,12 PLY USP TYPE VII, 0.2% POLYHEXAMETHYLENE BIGUANIDE HCI (PHMB): Brand: CURITY

## (undated) DEVICE — MINOR SPLIT GENERAL: Brand: MEDLINE INDUSTRIES, INC.

## (undated) DEVICE — ELECTRODE NDL 2.8IN COAT VALLEYLAB

## (undated) DEVICE — SUTURE MCRYL SZ 4-0 L27IN ABSRB UD L19MM PS-2 1/2 CIR PRIM Y426H

## (undated) DEVICE — ATHLETIC SUPPORTER LATEX FREE, LARGE

## (undated) DEVICE — GOWN,REINFORCED,POLY,SIRUS,XLNG/XXLG: Brand: MEDLINE

## (undated) DEVICE — GARMENT,MEDLINE,DVT,INT,CALF,MED, GEN2: Brand: MEDLINE

## (undated) DEVICE — BUTTON SWITCH PENCIL BLADE ELECTRODE, HOLSTER: Brand: EDGE

## (undated) DEVICE — BLADE ASSEMB CLP HAIR FINE --

## (undated) DEVICE — SHEET, T, LAPAROTOMY, STERILE: Brand: MEDLINE

## (undated) DEVICE — REM POLYHESIVE ADULT PATIENT RETURN ELECTRODE: Brand: VALLEYLAB

## (undated) DEVICE — DRAIN SURG PENROSE 0.25X12 IN CLOSED WND DRAINAGE PREM SIL

## (undated) DEVICE — SPONGE LAP 18X18IN STRL -- 5/PK

## (undated) DEVICE — SUT CHRMC 4-0 27IN SH BRN --

## (undated) DEVICE — TRAY PREP DRY W/ PREM GLV 2 APPL 6 SPNG 2 UNDPD 1 OVERWRAP

## (undated) DEVICE — DERMABOND SKIN ADH 0.7ML -- DERMABOND ADVANCED 12/BX

## (undated) DEVICE — AMD ANTIMICROBIAL SUPER SPONGES,MEDIUM: Brand: KERLIX